# Patient Record
Sex: FEMALE | Race: WHITE
[De-identification: names, ages, dates, MRNs, and addresses within clinical notes are randomized per-mention and may not be internally consistent; named-entity substitution may affect disease eponyms.]

---

## 2020-05-28 ENCOUNTER — HOSPITAL ENCOUNTER (EMERGENCY)
Dept: HOSPITAL 61 - ER | Age: 20
Discharge: HOME | End: 2020-05-28
Payer: SELF-PAY

## 2020-05-28 VITALS — WEIGHT: 266.54 LBS | BODY MASS INDEX: 41.83 KG/M2 | HEIGHT: 67 IN

## 2020-05-28 VITALS — DIASTOLIC BLOOD PRESSURE: 68 MMHG | SYSTOLIC BLOOD PRESSURE: 122 MMHG

## 2020-05-28 DIAGNOSIS — R11.0: ICD-10-CM

## 2020-05-28 DIAGNOSIS — N10: Primary | ICD-10-CM

## 2020-05-28 DIAGNOSIS — R10.32: ICD-10-CM

## 2020-05-28 DIAGNOSIS — R30.0: ICD-10-CM

## 2020-05-28 LAB
ALBUMIN SERPL-MCNC: 3.3 G/DL (ref 3.4–5)
ALBUMIN/GLOB SERPL: 0.9 {RATIO} (ref 1–1.7)
ALP SERPL-CCNC: 72 U/L (ref 46–116)
ALT SERPL-CCNC: 18 U/L (ref 14–59)
ANION GAP SERPL CALC-SCNC: 13 MMOL/L (ref 6–14)
APTT PPP: YELLOW S
AST SERPL-CCNC: 13 U/L (ref 15–37)
BACTERIA #/AREA URNS HPF: (no result) /HPF
BASOPHILS # BLD AUTO: 0 X10^3/UL (ref 0–0.2)
BASOPHILS NFR BLD: 0 % (ref 0–3)
BILIRUB SERPL-MCNC: 0.5 MG/DL (ref 0.2–1)
BILIRUB UR QL STRIP: NEGATIVE
BUN SERPL-MCNC: 8 MG/DL (ref 7–20)
BUN/CREAT SERPL: 9 (ref 6–20)
CALCIUM SERPL-MCNC: 8.7 MG/DL (ref 8.5–10.1)
CHLORIDE SERPL-SCNC: 97 MMOL/L (ref 98–107)
CO2 SERPL-SCNC: 25 MMOL/L (ref 21–32)
CREAT SERPL-MCNC: 0.9 MG/DL (ref 0.6–1)
EOSINOPHIL NFR BLD: 0 % (ref 0–3)
EOSINOPHIL NFR BLD: 0 X10^3/UL (ref 0–0.7)
ERYTHROCYTE [DISTWIDTH] IN BLOOD BY AUTOMATED COUNT: 12.6 % (ref 11.5–14.5)
FIBRINOGEN PPP-MCNC: CLEAR MG/DL
GFR SERPLBLD BASED ON 1.73 SQ M-ARVRAT: 80.7 ML/MIN
GLOBULIN SER-MCNC: 3.5 G/DL (ref 2.2–3.8)
GLUCOSE SERPL-MCNC: 88 MG/DL (ref 70–99)
HCT VFR BLD CALC: 39.3 % (ref 36–47)
HGB BLD-MCNC: 13.4 G/DL (ref 12–15.5)
LIPASE: 52 U/L (ref 73–393)
LYMPHOCYTES # BLD: 2.4 X10^3/UL (ref 1–4.8)
LYMPHOCYTES NFR BLD AUTO: 17 % (ref 24–48)
MCH RBC QN AUTO: 29 PG (ref 25–35)
MCHC RBC AUTO-ENTMCNC: 34 G/DL (ref 31–37)
MCV RBC AUTO: 86 FL (ref 79–100)
MONO #: 1.3 X10^3/UL (ref 0–1.1)
MONOCYTES NFR BLD: 10 % (ref 0–9)
NEUT #: 9.8 X10^3/UL (ref 1.8–7.7)
NEUTROPHILS NFR BLD AUTO: 72 % (ref 31–73)
NITRITE UR QL STRIP: NEGATIVE
PH UR STRIP: 6 [PH]
PLATELET # BLD AUTO: 262 X10^3/UL (ref 140–400)
POTASSIUM SERPL-SCNC: 3.8 MMOL/L (ref 3.5–5.1)
PROT SERPL-MCNC: 6.8 G/DL (ref 6.4–8.2)
PROT UR STRIP-MCNC: 30 MG/DL
RBC # BLD AUTO: 4.57 X10^6/UL (ref 3.5–5.4)
RBC #/AREA URNS HPF: (no result) /HPF (ref 0–2)
SODIUM SERPL-SCNC: 135 MMOL/L (ref 136–145)
SQUAMOUS #/AREA URNS LPF: (no result) /LPF
UROBILINOGEN UR-MCNC: 1 MG/DL
WBC # BLD AUTO: 13.5 X10^3/UL (ref 4–11)
WBC #/AREA URNS HPF: (no result) /HPF (ref 0–4)
YEAST #/AREA URNS HPF: PRESENT /HPF

## 2020-05-28 PROCEDURE — 83690 ASSAY OF LIPASE: CPT

## 2020-05-28 PROCEDURE — 99285 EMERGENCY DEPT VISIT HI MDM: CPT

## 2020-05-28 PROCEDURE — 81025 URINE PREGNANCY TEST: CPT

## 2020-05-28 PROCEDURE — 36415 COLL VENOUS BLD VENIPUNCTURE: CPT

## 2020-05-28 PROCEDURE — 87086 URINE CULTURE/COLONY COUNT: CPT

## 2020-05-28 PROCEDURE — 81001 URINALYSIS AUTO W/SCOPE: CPT

## 2020-05-28 PROCEDURE — 80053 COMPREHEN METABOLIC PANEL: CPT

## 2020-05-28 PROCEDURE — 74177 CT ABD & PELVIS W/CONTRAST: CPT

## 2020-05-28 PROCEDURE — 96375 TX/PRO/DX INJ NEW DRUG ADDON: CPT

## 2020-05-28 PROCEDURE — 85025 COMPLETE CBC W/AUTO DIFF WBC: CPT

## 2020-05-28 PROCEDURE — 96374 THER/PROPH/DIAG INJ IV PUSH: CPT

## 2020-05-28 NOTE — RAD
CT ABD PELV W/ IV CONTRST ONLY 

 

History: LLQ Pain  

 

Comparison: None.

 

Technique: After administration of intravenous contrast, helical CT of the

abdomen and pelvis was performed from the lung bases through the ischial 

tuberosities. Coronal and sagittal reconstructions were obtained. 75 mL of

Omnipaque 300 were used. One or more of the following dose reduction 

techniques were utilized: Automated exposure control (AEC), Adjustment of 

mA and/or kV according to patient size, Use of iterative reconstruction 

technique such as ASiR, CT scan done according to ALARA and image 

gently/image wisely

 

Abdomen Findings:

The visualized lung bases are clear.

 

The liver, gallbladder, pancreas, spleen, and bilateral adrenal glands are

normal. 

 

Subtle left renal striated nephrogram. Asymmetric left perinephric 

stranding. No opaque urinary calculi. There is no hydronephrosis.   

 

The visualized loops of small bowel are normal. The visualized loops of 

large bowel are normal. There is no evidence of bowel obstruction. 

Appendix is not seen. 

 

There is no free fluid.

 

There is no mesenteric or retroperitoneal adenopathy. 

 

The abdominal aorta is normal in caliber. 

 

Pelvis Findings:

Urinary bladder is normal. Uterus is present. There is no pelvic or 

inguinal adenopathy.  

 

There is no acute bony abnormality.  

 

IMPRESSION:

Subtle left renal striated nephrogram with asymmetric perinephric 

stranding, which may indicate acute pyelonephritis.

 

No hydronephrosis or opaque urinary calculi.

 

Electronically signed by: Adarsh Johnson MD (5/28/2020 8:50 PM) [id number]

## 2020-05-28 NOTE — PHYS DOC
Past Medical History


Past Medical History:  No Pertinent History


Past Surgical History:  No Surgical History


Smoking Status:  Never Smoker


Alcohol Use:  None





General Adult


EDM:


Chief Complaint:  HEADACHE





HPI:


HPI:





Patient is a 19-year-old female with multiple medical complaints today.  First 

she states she is having lower abdominal pain that is mainly localized to the 

left lower quadrant.  She states she has had some associated nausea but no 

diarrhea or vomiting.  She denies any melena or hematochezia.  She does states 

she has had some dysuria as well.  She also complains of a headache and 

describes photophobia and phonophobia.  She is unsure whether the nausea is 

associated with her headache or her abdominal pain.  She has not had any fever 

that she knows of.  []





Review of Systems:


Review of Systems:


Constitutional:  Denies fever or chills. []


Eyes:  Denies change in visual acuity. []


HENT:  Denies nasal congestion or sore throat. [] 


Respiratory:  Denies cough or shortness of breath. [] 


Cardiovascular:  Denies chest pain or edema. [] 


GI: Per HPI [] 


:  Denies dysuria. [] 


Musculoskeletal:  Denies back pain or joint pain. [] 


Integument:  Denies rash. [] 


Neurologic: Reports headache [] 


Endocrine:  Denies polyuria or polydipsia. [] 


Lymphatic:  Denies swollen glands. [] 


Psychiatric: Reports anxiety []





Heart Score:


Risk Factors:


Risk Factors:  DM, Current or recent (<one month) smoker, HTN, HLP, family 

history of CAD, obesity.


Risk Scores:


Score 0 - 3:  2.5% MACE over next 6 weeks - Discharge Home


Score 4 - 6:  20.3% MACE over next 6 weeks - Admit for Clinical Observation


Score 7 - 10:  72.7% MACE over next 6 weeks - Early Invasive Strategies





Current Medications:





Current Medications








 Medications


  (Trade)  Dose


 Ordered  Sig/Nadeen  Start Time


 Stop Time Status Last Admin


Dose Admin


 


 Diphenhydramine


 HCl


  (Benadryl)  25 mg  1X  ONCE  5/28/20 18:45


 5/28/20 19:01 DC 5/28/20 19:08


25 MG


 


 Iohexol


  (Omnipaque 300


 Mg/ml)  75 ml  1X  ONCE  5/28/20 19:15


 5/28/20 19:22 DC  





 


 Ketorolac


 Tromethamine


  (Toradol 30mg


 Vial)  30 mg  1X  ONCE  5/28/20 18:45


 5/28/20 19:01 DC 5/28/20 19:08


30 MG


 


 Metoclopramide HCl


  (Reglan Vial)  10 mg  1X  ONCE  5/28/20 18:45


 5/28/20 19:01 DC 5/28/20 19:08


10 MG


 


 Sodium Chloride  1,000 ml @ 


 1,000 mls/hr  1X  ONCE  5/28/20 18:45


 5/28/20 19:44 DC 5/28/20 19:08


1,000 MLS/HR











Allergies:


Allergies:





Allergies








Coded Allergies Type Severity Reaction Last Updated Verified


 


  No Known Drug Allergies    5/28/20 No











Physical Exam:


PE:





Constitutional: Well developed, well nourished, mild distress, non-toxic 

appearance. []


HENT: Normocephalic, atraumatic, bilateral external ears normal, oropharynx 

moist, no oral exudates, nose normal. []


Eyes: PERRLA, EOMI, conjunctiva normal, no discharge. [] 


Neck: Normal range of motion, no tenderness, supple, no stridor. [] 


Cardiovascular:Heart rate regular rhythm, no murmur []


Lungs & Thorax:  Bilateral breath sounds clear to auscultation []


Abdomen: Obese bowel sounds normal, soft with some tenderness in the 

suprapubic/left lower quadrant area. [] 


Skin: Warm, dry, no erythema, no rash. [] 


Back: No tenderness, no CVA tenderness. [] 


Extremities: No tenderness, no cyanosis, no clubbing, ROM intact, no edema. [] 


Neurologic: Alert and oriented X 3, normal motor function, normal sensory 

function, no focal deficits noted. []


Psychologic: Anxious []





Current Patient Data:


Labs:





                                Laboratory Tests








Test


 5/28/20


17:30 5/28/20


19:20 5/28/20


19:24


 


White Blood Count


 13.5 x10^3/uL


(4.0-11.0)  H 


 





 


Red Blood Count


 4.57 x10^6/uL


(3.50-5.40) 


 





 


Hemoglobin


 13.4 g/dL


(12.0-15.5) 


 





 


Hematocrit


 39.3 %


(36.0-47.0) 


 





 


Mean Corpuscular Volume


 86 fL ()


 


 





 


Mean Corpuscular Hemoglobin 29 pg (25-35)    


 


Mean Corpuscular Hemoglobin


Concent 34 g/dL


(31-37) 


 





 


Red Cell Distribution Width


 12.6 %


(11.5-14.5) 


 





 


Platelet Count


 262 x10^3/uL


(140-400) 


 





 


Neutrophils (%) (Auto) 72 % (31-73)    


 


Lymphocytes (%) (Auto) 17 % (24-48)  L  


 


Monocytes (%) (Auto) 10 % (0-9)  H  


 


Eosinophils (%) (Auto) 0 % (0-3)    


 


Basophils (%) (Auto) 0 % (0-3)    


 


Neutrophils # (Auto)


 9.8 x10^3/uL


(1.8-7.7)  H 


 





 


Lymphocytes # (Auto)


 2.4 x10^3/uL


(1.0-4.8) 


 





 


Monocytes # (Auto)


 1.3 x10^3/uL


(0.0-1.1)  H 


 





 


Eosinophils # (Auto)


 0.0 x10^3/uL


(0.0-0.7) 


 





 


Basophils # (Auto)


 0.0 x10^3/uL


(0.0-0.2) 


 





 


Sodium Level


 135 mmol/L


(136-145)  L 


 





 


Potassium Level


 3.8 mmol/L


(3.5-5.1) 


 





 


Chloride Level


 97 mmol/L


()  L 


 





 


Carbon Dioxide Level


 25 mmol/L


(21-32) 


 





 


Anion Gap 13 (6-14)    


 


Blood Urea Nitrogen


 8 mg/dL (7-20)


 


 





 


Creatinine


 0.9 mg/dL


(0.6-1.0) 


 





 


Estimated GFR


(Cockcroft-Gault) 80.7  


 


 





 


BUN/Creatinine Ratio 9 (6-20)    


 


Glucose Level


 88 mg/dL


(70-99) 


 





 


Calcium Level


 8.7 mg/dL


(8.5-10.1) 


 





 


Total Bilirubin


 0.5 mg/dL


(0.2-1.0) 


 





 


Aspartate Amino Transferase


(AST) 13 U/L (15-37)


L 


 





 


Alanine Aminotransferase (ALT)


 18 U/L (14-59)


 


 





 


Alkaline Phosphatase


 72 U/L


() 


 





 


Total Protein


 6.8 g/dL


(6.4-8.2) 


 





 


Albumin


 3.3 g/dL


(3.4-5.0)  L 


 





 


Albumin/Globulin Ratio


 0.9 (1.0-1.7)


L 


 





 


Lipase


 52 U/L


()  L 


 





 


Urine Collection Type  Unknown   


 


Urine Color  Yellow   


 


Urine Clarity  Clear   


 


Urine pH


 


 6.0 (<5.0-8.0)


 





 


Urine Specific Gravity


 


 1.010


(1.000-1.030) 





 


Urine Protein


 


 30 mg/dL


(NEG-TRACE) 





 


Urine Glucose (UA)


 


 Negative mg/dL


(NEG) 





 


Urine Ketones (Stick)


 


 >=80 mg/dL


(NEG) 





 


Urine Blood


 


 Negative (NEG)


 





 


Urine Nitrite


 


 Negative (NEG)


 





 


Urine Bilirubin


 


 Negative (NEG)


 





 


Urine Urobilinogen Dipstick


 


 1.0 mg/dL (0.2


mg/dL) 





 


Urine Leukocyte Esterase  Small (NEG)   


 


Urine RBC


 


 1-2 /HPF (0-2)


 





 


Urine WBC


 


 Tntc /HPF


(0-4) 





 


Urine Squamous Epithelial


Cells 


 Many /LPF  


 





 


Urine Bacteria


 


 Few /HPF


(0-FEW) 





 


Urine Mucus  Mod /LPF   


 


Urine Yeast  Present /HPF   


 


POC Urine HCG, Qualitative


 


 


 Hcg negative


(Negative)





                                Laboratory Tests


5/28/20 17:30








                                Laboratory Tests


5/28/20 17:30








Vital Signs:





                                   Vital Signs








  Date Time  Temp Pulse Resp B/P (MAP) Pulse Ox O2 Delivery O2 Flow Rate FiO2


 


5/28/20 17:52  68   98   


 


5/28/20 17:40 100.9  16 151/81 (104)  Room Air  





 100.9       











EKG:


EKG:


[]





Radiology/Procedures:


Radiology/Procedures:


[]


CT ABD PELV W/ IV CONTRST ONLY 


 


History: LLQ Pain  


 


Comparison: None.


 


Technique: After administration of intravenous contrast, helical CT of the


abdomen and pelvis was performed from the lung bases through the ischial 


tuberosities. Coronal and sagittal reconstructions were obtained. 75 mL of


Omnipaque 300 were used. One or more of the following dose reduction 


techniques were utilized: Automated exposure control (AEC), Adjustment of 


mA and/or kV according to patient size, Use of iterative reconstruction 


technique such as ASiR, CT scan done according to ALARA and image 


gently/image wisely


 


Abdomen Findings:


The visualized lung bases are clear.


 


The liver, gallbladder, pancreas, spleen, and bilateral adrenal glands are


normal. 


 


Subtle left renal striated nephrogram. Asymmetric left perinephric 


stranding. No opaque urinary calculi. There is no hydronephrosis.   


 


The visualized loops of small bowel are normal. The visualized loops of 


large bowel are normal. There is no evidence of bowel obstruction. 


Appendix is not seen. 


 


There is no free fluid.


 


There is no mesenteric or retroperitoneal adenopathy. 


 


The abdominal aorta is normal in caliber. 


 


Pelvis Findings:


Urinary bladder is normal. Uterus is present. There is no pelvic or 


inguinal adenopathy.  


 


There is no acute bony abnormality.  


 


IMPRESSION:


Subtle left renal striated nephrogram with asymmetric perinephric 


stranding, which may indicate acute pyelonephritis.





Course & Med Decision Making:


Course & Med Decision Making


Pertinent Labs and Imaging studies reviewed. (See chart for details)





[ED course: Evaluation reveals a 19-year-old female who is complaining of left 

lower quadrant pain among other symptoms.  Her urinalysis shows many white blood

 cells and bacteria CT scan showed evidence of acute pyelonephritis.  She was 

given IV fluids antibiotics during her stay in the emergency department with 

relief of her symptoms.  We will treat her with antibiotics as an outpatient.]





Dragon Disclaimer:


Dragon Disclaimer:


This electronic medical record was generated, in whole or in part, using a voice

recognition dictation system.





Departure


Departure


Impression:  


   Primary Impression:  


   Acute pyelonephritis


Disposition:  01 HOME, SELF-CARE


Condition:  STABLE


Referrals:  


RONY KELLER MD (PCP)


Patient Instructions:  Pyelonephritis, Adult


Scripts


Ondansetron (ONDANSETRON ODT) 4 Mg Tab.rapdis


1 TAB PO PRN Q6-8HRS for VOMITING, #16 TAB


   Prov: DAVI MUELLER DO         5/28/20 


Hydrocodone/Apap 5-325 (NORCO 5-325 TABLET) 1 Each Tablet


1 TAB PO PRN Q6HRS PRN for PAIN, #12 TAB 0 Refills


   Prov: DAVI MUELLER DO         5/28/20 


Levofloxacin (LEVAQUIN) 500 Mg Tablet


1 TAB PO DAILY for urinary tract infection, #10 TAB


   Prov: DAVI MUELLER DO         5/28/20











DAVI MUELLER DO             May 28, 2020 20:43